# Patient Record
(demographics unavailable — no encounter records)

---

## 2024-11-07 NOTE — REASON FOR VISIT
[Initial Consultation] : an initial consultation visit for [[employer] ] : provided by [employer]   [Mother] : mother [FreeTextEntry3] : HIV exposure [Interpreters_IDNumber] : [id number] [Interpreters_FullName] : Justus [TWNoteComboBox1] : [place]

## 2024-11-07 NOTE — BIRTH HISTORY
[At ___ Weeks Gestation] : at [unfilled] weeks gestation [ Section] : by  section [Other: ________] : in [unfilled] [de-identified] : due to PIH and concern for pre-eclampsia [FreeTextEntry1] : [id number] [FreeTextEntry6] : 17 day NICU stay, slow feeding at [facility]

## 2024-11-07 NOTE — CONSULT LETTER
[Dear  ___] : Dear  [unfilled], [Consult Letter:] : I had the pleasure of evaluating your patient, [unfilled]. [Please see my note below.] : Please see my note below. [Consult Closing:] : Thank you very much for allowing me to participate in the care of this patient.  If you have any questions, please do not hesitate to contact me. [Sincerely,] : Sincerely, [FreeTextEntry3] : Chad Prado DO, MPH\par  Pediatric Infectious Diseases, [facility]\par  , [facility] [employer] [employer]\par